# Patient Record
Sex: MALE | Race: AMERICAN INDIAN OR ALASKA NATIVE
[De-identification: names, ages, dates, MRNs, and addresses within clinical notes are randomized per-mention and may not be internally consistent; named-entity substitution may affect disease eponyms.]

---

## 2020-05-06 ENCOUNTER — HOSPITAL ENCOUNTER (INPATIENT)
Dept: HOSPITAL 5 - ED | Age: 35
LOS: 1 days | Discharge: HOME | DRG: 442 | End: 2020-05-07
Attending: INTERNAL MEDICINE | Admitting: INTERNAL MEDICINE
Payer: SELF-PAY

## 2020-05-06 DIAGNOSIS — K72.90: Primary | ICD-10-CM

## 2020-05-06 DIAGNOSIS — E72.20: ICD-10-CM

## 2020-05-06 LAB
ALBUMIN SERPL-MCNC: 3.7 G/DL (ref 3.9–5)
ALT SERPL-CCNC: 17 UNITS/L (ref 7–56)
BASOPHILS # (AUTO): 0 K/MM3 (ref 0–0.1)
BASOPHILS NFR BLD AUTO: 0.4 % (ref 0–1.8)
BENZODIAZEPINES SCREEN,URINE: (no result)
BILIRUB UR QL STRIP: (no result)
BLOOD UR QL VISUAL: (no result)
BUN SERPL-MCNC: 9 MG/DL (ref 9–20)
BUN/CREAT SERPL: 13 %
CALCIUM SERPL-MCNC: 9.3 MG/DL (ref 8.4–10.2)
EOSINOPHIL # BLD AUTO: 0.3 K/MM3 (ref 0–0.4)
EOSINOPHIL NFR BLD AUTO: 5.1 % (ref 0–4.3)
HCT VFR BLD CALC: 40.6 % (ref 35.5–45.6)
HEMOLYSIS INDEX: 5
HGB BLD-MCNC: 13.7 GM/DL (ref 11.8–15.2)
INR PPP: 1.01 (ref 0.87–1.13)
IRON SERPL-MCNC: 55 UG/DL (ref 49–181)
LYMPHOCYTES # BLD AUTO: 2.4 K/MM3 (ref 1.2–5.4)
LYMPHOCYTES NFR BLD AUTO: 37.2 % (ref 13.4–35)
MCHC RBC AUTO-ENTMCNC: 34 % (ref 32–34)
MCV RBC AUTO: 90 FL (ref 84–94)
METHADONE SCREEN,URINE: (no result)
MONOCYTES # (AUTO): 0.4 K/MM3 (ref 0–0.8)
MONOCYTES % (AUTO): 6.5 % (ref 0–7.3)
MUCOUS THREADS #/AREA URNS HPF: (no result) /HPF
OPIATE SCREEN,URINE: (no result)
PH UR STRIP: 5 [PH] (ref 5–7)
PLATELET # BLD: 209 K/MM3 (ref 140–440)
RBC # BLD AUTO: 4.54 M/MM3 (ref 3.65–5.03)
RBC #/AREA URNS HPF: 5 /HPF (ref 0–6)
TIBC SERPL-MCNC: 309 MCG/DL (ref 250–450)
UROBILINOGEN UR-MCNC: 4 MG/DL (ref ?–2)
WBC #/AREA URNS HPF: 2 /HPF (ref 0–6)

## 2020-05-06 PROCEDURE — 74177 CT ABD & PELVIS W/CONTRAST: CPT

## 2020-05-06 PROCEDURE — 80307 DRUG TEST PRSMV CHEM ANLYZR: CPT

## 2020-05-06 PROCEDURE — 36415 COLL VENOUS BLD VENIPUNCTURE: CPT

## 2020-05-06 PROCEDURE — 85025 COMPLETE CBC W/AUTO DIFF WBC: CPT

## 2020-05-06 PROCEDURE — 86706 HEP B SURFACE ANTIBODY: CPT

## 2020-05-06 PROCEDURE — G0480 DRUG TEST DEF 1-7 CLASSES: HCPCS

## 2020-05-06 PROCEDURE — 84484 ASSAY OF TROPONIN QUANT: CPT

## 2020-05-06 PROCEDURE — 82390 ASSAY OF CERULOPLASMIN: CPT

## 2020-05-06 PROCEDURE — 76705 ECHO EXAM OF ABDOMEN: CPT

## 2020-05-06 PROCEDURE — 80053 COMPREHEN METABOLIC PANEL: CPT

## 2020-05-06 PROCEDURE — 93005 ELECTROCARDIOGRAM TRACING: CPT

## 2020-05-06 PROCEDURE — 86803 HEPATITIS C AB TEST: CPT

## 2020-05-06 PROCEDURE — 83550 IRON BINDING TEST: CPT

## 2020-05-06 PROCEDURE — 82140 ASSAY OF AMMONIA: CPT

## 2020-05-06 PROCEDURE — 81001 URINALYSIS AUTO W/SCOPE: CPT

## 2020-05-06 PROCEDURE — 85610 PROTHROMBIN TIME: CPT

## 2020-05-06 PROCEDURE — 80048 BASIC METABOLIC PNL TOTAL CA: CPT

## 2020-05-06 PROCEDURE — 80074 ACUTE HEPATITIS PANEL: CPT

## 2020-05-06 PROCEDURE — 80320 DRUG SCREEN QUANTALCOHOLS: CPT

## 2020-05-06 PROCEDURE — 84443 ASSAY THYROID STIM HORMONE: CPT

## 2020-05-06 PROCEDURE — 70450 CT HEAD/BRAIN W/O DYE: CPT

## 2020-05-06 RX ADMIN — Medication SCH ML: at 13:21

## 2020-05-06 RX ADMIN — LACTULOSE SCH: 20 SOLUTION ORAL at 07:30

## 2020-05-06 RX ADMIN — LACTULOSE SCH GM: 20 SOLUTION ORAL at 23:59

## 2020-05-06 RX ADMIN — LACTULOSE SCH GM: 20 SOLUTION ORAL at 13:20

## 2020-05-06 RX ADMIN — LACTULOSE SCH GM: 20 SOLUTION ORAL at 17:05

## 2020-05-06 RX ADMIN — Medication SCH ML: at 22:16

## 2020-05-06 NOTE — ULTRASOUND REPORT
ABDOMINAL ULTRASOUND LIMITED (RIGHT UPPER QUADRANT)



HISTORY: Altered mental status and elevated ammonia levels.



COMPARISON: None.



TECHNIQUE: Multiple real-time ultrasonographic grayscale images were obtained of the right upper abdo
men.



FINDINGS:

Pancreas: Obscured by poor acoustic windows.

Liver: Normal size with the right lobe measuring 15 cm in length. However, mild diffuse increased ech
ogenicity with a heterogeneous nodular echo pattern.

Gallbladder: No stones, wall thickening or pericholecystic fluid.

Common bile duct: 3.0 mm.

Right kidney: No significant abnormality.  No hydronephrosis.  Kidney measures 9.8 cm.



Additional findings: None.



IMPRESSION:

1. Changes in the liver suggest possible hepatic cirrhosis.



2. No cholelithiasis and no biliary obstruction.



Signer Name: Chepe Cantu MD 

Signed: 5/6/2020 11:52 AM

Workstation Name: [id number]

## 2020-05-06 NOTE — EVENT NOTE
Date: 05/06/20


Patient is 34 yo with altered mental status, elevated Ammonia. I have seen and 

examined him.

## 2020-05-06 NOTE — CAT SCAN REPORT
CT ABDOMEN AND PELVIS WITH IV CONTRAST



INDICATION:

Liver disease with cirrhosis.



COMPARISON:

None available.



TECHNIQUE:

Axial CT images were obtained through the abdomen and pelvis after 100 mL IV contrast. All CT scans a
t this location are performed using CT dose reduction for ALARA by means of automated exposure contro
l. 



FINDINGS -- ABDOMEN:

Lung Bases: No acute abnormality.

Liver: The liver is grossly normal in size. The caudate lobe is slightly prominent..

Gallbladder: Normal.  Bile Ducts: Normal.

Pancreas: Normal.

Spleen: Normal.

Adrenals: Normal.

Right Kidney and Proximal Ureter: Normal.

Left Kidney and Proximal Ureter: Normal.

Stomach and Bowel: Normal.

Lymph Nodes: No significant adenopathy.

Aorta: No significant abnormality.  IVC: Normal.

Additional Findings: None.



FINDINGS -- PELVIS:

Urinary Bladder and Distal Ureters: Normal.

Reproductive Organs: No acute abnormality.

Appendix: Normal.  Bowel: No acute abnormality.

Free Fluid: None.

Lymph Nodes: No significant adenopathy.

Additional Findings: None.



Skeletal System: No acute abnormality.



IMPRESSION:

Slightly prominent caudate lobe which could be seen with early cirrhosis. No evidence of splenomegaly
 or ascites to definitely suggest portal venous hypertension at this time. The main portal vein measu
res approximately 11 mm in greatest diameter. No focal hepatic lesion is identified.



Signer Name: Jozef Chirinos MD 

Signed: 5/6/2020 9:16 PM

Workstation Name: [id number]

## 2020-05-06 NOTE — GASTROENTEROLOGY CONSULTATION
History of Present Illness





- Reason for Consult


Consult date: 05/06/20


cirrhosis/ams


Requesting physician: KERVIN EDMONDSON





- History of Present Illness





The patient is a 34 yo male who presents with ams.  pt able to provide some 

history on exam but easily distracted and difficulty gathering thoughts.  

reportedly presented with confusion/ams.  he is oriented x 3 on exam, reports 

occasionally drinking bottle of wine but does not elaborate further.  states he 

has not been feeling well since trying cocaine 10 years ago.  he is not aware of

h/o liver disease.  US showed possible signs of cirrhosis, and ammonia is 

elevated for which gi has been consulted





Past History


Past Medical History: No medical history


Past Surgical History: No surgical history


Social history: no significant social history





Medications and Allergies


                                    Allergies











Allergy/AdvReac Type Severity Reaction Status Date / Time


 


No Known Allergies Allergy   Verified 05/06/20 01:51











                                Home Medications











 Medication  Instructions  Recorded  Confirmed  Last Taken  Type


 


No Known Home Medications [No  05/06/20 05/06/20 Unknown History





Reported Home Medications]     











Active Meds: 


Active Medications





Acetaminophen (Tylenol)  650 mg PO Q4H PRN


   PRN Reason: Pain MILD(1-3)/Fever >100.5/HA


Sodium Chloride (Nacl 0.9% 1000 Ml)  1,000 mls @ 125 mls/hr IV AS DIRECT Former[facility]


   Last Admin: 05/06/20 07:47 Dose:  125 mls/hr


   Documented by: 


Lactulose (Cephulac)  20 gm PO Q6HR [facility]


   Last Admin: 05/06/20 13:20 Dose:  20 gm


   Documented by: 


Ondansetron HCl (Zofran)  4 mg IV Q8H PRN


   PRN Reason: Nausea And Vomiting


Sodium Chloride (Sodium Chloride Flush Syringe 10 Ml)  10 ml IV BID [facility]


   Last Admin: 05/06/20 13:21 Dose:  10 ml


   Documented by: 


Sodium Chloride (Sodium Chloride Flush Syringe 10 Ml)  10 ml IV PRN PRN


   PRN Reason: LINE FLUSH





reviewed/updated patient's home and current medications





Review of Systems





- Review of Systems


ROS unobtainable: due to mental status





Exam





- Constitutional


Vital Signs: 


                                        











Temp Pulse Resp BP Pulse Ox


 


 98.6 F   74   18   129/67   97 


 


 05/06/20 12:31  05/06/20 16:22  05/06/20 12:31  05/06/20 12:31  05/06/20 12:31











General appearance: no acute distress





- EENT


Eyes: PERRL, EOM intact





- Neck


Neck: supple





- Respiratory


Respiratory effort: normal


Respiratory: bilateral: CTA





- Cardiovascular


Rhythm: regular


Heart Sounds: Present: S1 & S2


Extremities: No edema





- Gastrointestinal


General gastrointestinal: Present: soft, non-tender, non-distended





- Integumentary


Integumentary: Present: clear, warm





- Neurologic


Neurological: alert and oriented x3, other (neg asterixis)





- Labs


CBC & Chem 7: 


                                 05/06/20 02:37





                                 05/06/20 02:37


Lab Results: 


                         Laboratory Results - last 24 hr











  05/06/20 05/06/20 05/06/20





  02:37 02:37 02:37


 


WBC  6.6  


 


RBC  4.54  


 


Hgb  13.7  


 


Hct  40.6  


 


MCV  90  


 


MCH  30  


 


MCHC  34  


 


RDW  14.9  


 


Plt Count  209  


 


Lymph % (Auto)  37.2 H  


 


Mono % (Auto)  6.5  


 


Eos % (Auto)  5.1 H  


 


Baso % (Auto)  0.4  


 


Lymph #  2.4  


 


Mono #  0.4  


 


Eos #  0.3  


 


Baso #  0.0  


 


Seg Neutrophils %  50.8  


 


Seg Neutrophils #  3.3  


 


PT   


 


INR   


 


Sodium   143 


 


Potassium   3.9 


 


Chloride   105.5 


 


Carbon Dioxide   27 


 


Anion Gap   14 


 


BUN   9 


 


Creatinine   0.7 L 


 


Estimated GFR   > 60 


 


BUN/Creatinine Ratio   13 


 


Glucose   121 H 


 


Calcium   9.3 


 


Total Bilirubin   0.20 


 


AST   14 


 


ALT   17 


 


Alkaline Phosphatase   94 


 


Ammonia    82.0 H


 


Troponin T   < 0.010 


 


Total Protein   6.1 L 


 


Albumin   3.7 L 


 


Albumin/Globulin Ratio   1.5 


 


TSH   


 


Urine Color   


 


Urine Turbidity   


 


Urine pH   


 


Ur Specific Gravity   


 


Urine Protein   


 


Urine Glucose (UA)   


 


Urine Ketones   


 


Urine Blood   


 


Urine Nitrite   


 


Urine Bilirubin   


 


Urine Ictotest   


 


Urine Urobilinogen   


 


Ur Leukocyte Esterase   


 


Urine WBC (Auto)   


 


Urine RBC (Auto)   


 


Urine Mucus   


 


Urine Opiates Screen   


 


Urine Methadone Screen   


 


Ur Barbiturates Screen   


 


Ur Phencyclidine Scrn   


 


Ur Amphetamines Screen   


 


U Benzodiazepines Scrn   


 


Urine Cocaine Screen   


 


U Marijuana (THC) Screen   


 


Drugs of Abuse Note   


 


Plasma/Serum Alcohol   


 


Hepatitis A IgM Ab   


 


Hep Bs Antigen   


 


Hep B Core IgM Ab   


 


Hepatitis C Antibody   














  05/06/20 05/06/20 05/06/20





  02:37 02:37 02:39


 


WBC   


 


RBC   


 


Hgb   


 


Hct   


 


MCV   


 


MCH   


 


MCHC   


 


RDW   


 


Plt Count   


 


Lymph % (Auto)   


 


Mono % (Auto)   


 


Eos % (Auto)   


 


Baso % (Auto)   


 


Lymph #   


 


Mono #   


 


Eos #   


 


Baso #   


 


Seg Neutrophils %   


 


Seg Neutrophils #   


 


PT   


 


INR   


 


Sodium   


 


Potassium   


 


Chloride   


 


Carbon Dioxide   


 


Anion Gap   


 


BUN   


 


Creatinine   


 


Estimated GFR   


 


BUN/Creatinine Ratio   


 


Glucose   


 


Calcium   


 


Total Bilirubin   


 


AST   


 


ALT   


 


Alkaline Phosphatase   


 


Ammonia   


 


Troponin T   


 


Total Protein   


 


Albumin   


 


Albumin/Globulin Ratio   


 


TSH  1.590  


 


Urine Color   


 


Urine Turbidity   


 


Urine pH   


 


Ur Specific Gravity   


 


Urine Protein   


 


Urine Glucose (UA)   


 


Urine Ketones   


 


Urine Blood   


 


Urine Nitrite   


 


Urine Bilirubin   


 


Urine Ictotest   


 


Urine Urobilinogen   


 


Ur Leukocyte Esterase   


 


Urine WBC (Auto)   


 


Urine RBC (Auto)   


 


Urine Mucus   


 


Urine Opiates Screen    Presumptive negative


 


Urine Methadone Screen    Presumptive negative


 


Ur Barbiturates Screen    Presumptive negative


 


Ur Phencyclidine Scrn    Presumptive negative


 


Ur Amphetamines Screen    Presumptive negative


 


U Benzodiazepines Scrn    Presumptive negative


 


Urine Cocaine Screen    Presumptive negative


 


U Marijuana (THC) Screen    Presumptive negative


 


Drugs of Abuse Note    Disclamer


 


Plasma/Serum Alcohol   < 0.01 


 


Hepatitis A IgM Ab   


 


Hep Bs Antigen   


 


Hep B Core IgM Ab   


 


Hepatitis C Antibody   














  05/06/20 05/06/20 05/06/20





  02:40 13:48 13:48


 


WBC   


 


RBC   


 


Hgb   


 


Hct   


 


MCV   


 


MCH   


 


MCHC   


 


RDW   


 


Plt Count   


 


Lymph % (Auto)   


 


Mono % (Auto)   


 


Eos % (Auto)   


 


Baso % (Auto)   


 


Lymph #   


 


Mono #   


 


Eos #   


 


Baso #   


 


Seg Neutrophils %   


 


Seg Neutrophils #   


 


PT    13.4


 


INR    1.01


 


Sodium   


 


Potassium   


 


Chloride   


 


Carbon Dioxide   


 


Anion Gap   


 


BUN   


 


Creatinine   


 


Estimated GFR   


 


BUN/Creatinine Ratio   


 


Glucose   


 


Calcium   


 


Total Bilirubin   


 


AST   


 


ALT   


 


Alkaline Phosphatase   


 


Ammonia   


 


Troponin T   


 


Total Protein   


 


Albumin   


 


Albumin/Globulin Ratio   


 


TSH   


 


Urine Color  Miriam  


 


Urine Turbidity  Clear  


 


Urine pH  5.0  


 


Ur Specific Gravity  1.036 H  


 


Urine Protein  30 mg/dl  


 


Urine Glucose (UA)  Neg  


 


Urine Ketones  Tr  


 


Urine Blood  Neg  


 


Urine Nitrite  Neg  


 


Urine Bilirubin  Sm  


 


Urine Ictotest  Negative  


 


Urine Urobilinogen  4.0  


 


Ur Leukocyte Esterase  Neg  


 


Urine WBC (Auto)  2.0  


 


Urine RBC (Auto)  5.0  


 


Urine Mucus  3+  


 


Urine Opiates Screen   


 


Urine Methadone Screen   


 


Ur Barbiturates Screen   


 


Ur Phencyclidine Scrn   


 


Ur Amphetamines Screen   


 


U Benzodiazepines Scrn   


 


Urine Cocaine Screen   


 


U Marijuana (THC) Screen   


 


Drugs of Abuse Note   


 


Plasma/Serum Alcohol   


 


Hepatitis A IgM Ab   Non-reactive 


 


Hep Bs Antigen   Non-reactive 


 


Hep B Core IgM Ab   Non-reactive 


 


Hepatitis C Antibody   Non-reactive 














Assessment and Plan





1. Altered mental status


2. ?cirrhosis - per US read however labs do not suggest this.  ct scan for 

better characterization of liver.  will check viral serologies, ceruloplasmin 

(given age, ams), iron studies to start work-up.  cont lactulose for now

## 2020-05-06 NOTE — CAT SCAN REPORT
CT head/brain wo con



INDICATION:  Altered Mental Status Unknown if patient is E.T.O.H..



TECHNIQUE:

All CT scans at this location are performed using the following dose modulation technique: Automated 
exposure control.



CONTRAST:  None.



COMPARISON: None available.



FINDINGS: The ventricular system is appropriate in size and configuration without midline shift. Nega
tive for mass, stroke or hemorrhage.



Evaluation sinuses demonstrate mild thickening greatest at the ethmoid air cells.



IMPRESSION: Mild thickening greatest at the ethmoid air cells.



Signer Name: Chase Banuelos MD 

Signed: 5/6/2020 4:23 AM

Workstation Name: [id number]

## 2020-05-06 NOTE — HISTORY AND PHYSICAL REPORT
History of Present Illness


History of present illness: 


35-year-old male was brought to the emergency room for confusion.  Very 

difficult to obtain a history from the patient, he only mumbles his name over 

and over.  Labs show elevated ammonia level, patient will be admitted for 

hepatic encephalopathy








PAST MEDICAL HISTORY: Unknown





PAST SURGICAL HISTORY: Unknown





SOCIAL HISTORY: Unknown





FAMILY HISTORY: Unknown











Medications and Allergies


                                    Allergies











Allergy/AdvReac Type Severity Reaction Status Date / Time


 


No Known Allergies Allergy   Verified 05/06/20 01:51











                                Home Medications











 Medication  Instructions  Recorded  Confirmed  Last Taken  Type


 


Lactulose [Cephulac] 20 gm PO BID #1 bottle 05/07/20  Unknown Rx











Active Meds: 


Active Medications





Acetaminophen (Tylenol)  650 mg PO Q4H PRN


   PRN Reason: Pain MILD(1-3)/Fever >100.5/HA


Ondansetron HCl (Zofran)  4 mg IV Q8H PRN


   PRN Reason: Nausea And Vomiting


Sodium Chloride (Sodium Chloride Flush Syringe 10 Ml)  10 ml IV BID SANDRA


Sodium Chloride (Sodium Chloride Flush Syringe 10 Ml)  10 ml IV PRN PRN


   PRN Reason: LINE FLUSH











Exam





- Physical Exam


Narrative exam: 


Gen. appearance: Patient lying in bed, no apparent distress


HEENT: Normocephalic, atraumatic, pupils equally round and reactive to light, 

extraocular movement intact, and no sclericterus,. No JVD or thyromegaly or 

nodule,neck supple, no carotid bruit ,mucous membranes moist, no exudate or 

erythema


Heart: S1, S2, regular rate and rhythm


Lungs: Crackles bilaterally, breathing comfortable


Abdomen: Positive bowel sounds, nontender, nondistended, no organomegaly


Extremity: no edema, cyanosis, clubbing


Skin: No rash, nodules, warm, dry


Neuro: speech is fluent, moves extremities, sensory intact











- Constitutional


Vitals: 


                                        











Temp Pulse Resp BP Pulse Ox


 


 98.0 F   53 L  13   104/54   96 


 


 05/06/20 02:10  05/06/20 05:30  05/06/20 05:30  05/06/20 05:30  05/06/20 05:30














Results





- Labs


CBC & Chem 7: 


                                 05/07/20 04:26





                                 05/07/20 04:26


Labs: 


                              Abnormal lab results











  05/06/20 05/06/20 05/06/20 Range/Units





  02:37 02:37 02:37 


 


Lymph % (Auto)  37.2 H    (13.4-35.0)  %


 


Eos % (Auto)  5.1 H    (0.0-4.3)  %


 


Creatinine   0.7 L   (0.8-1.5)  mg/dL


 


Glucose   121 H   ()  mg/dL


 


Ammonia    82.0 H  (25-60)  umol/L


 


Total Protein   6.1 L   (6.3-8.2)  g/dL


 


Albumin   3.7 L   (3.9-5)  g/dL


 


Ur Specific Gravity     (1.003-1.030)  














  05/06/20 Range/Units





  02:40 


 


Lymph % (Auto)   (13.4-35.0)  %


 


Eos % (Auto)   (0.0-4.3)  %


 


Creatinine   (0.8-1.5)  mg/dL


 


Glucose   ()  mg/dL


 


Ammonia   (25-60)  umol/L


 


Total Protein   (6.3-8.2)  g/dL


 


Albumin   (3.9-5)  g/dL


 


Ur Specific Gravity  1.036 H  (1.003-1.030)  














- Imaging and Cardiology


CT Scan - head: report reviewed





Assessment and Plan


Assessment


Hepatic encephalopathy


Start lactulose, IV fluids


Monitor ammonia level


DVT prophylax

## 2020-05-06 NOTE — EMERGENCY DEPARTMENT REPORT
HPI





- General


Chief Complaint: Dizziness


Time Seen by Provider: 05/06/20 02:06





- HPI


HPI: 





35-year-old male presents to the emergency department with a complaint of some 

lightheadedness/dizziness and saying that he just "does not feel right."  

However, the patient says that this has been going on over the past 5 years.  

When asked what brought him in this evening the patient just repeats that he 

"just does not feel right."  He denies any headache, vision change, slurred 

speech, fever, chest pain, shortness of breath, numbness or paresthesias.  The 

patient says that he feels confused and that he is forgetting things.  He denies

any past medical history.  He is a tobacco smoker but denies any recent illicit 

drug use.  He does not have a primary care physician.  No recent travel or sick 

contacts at home.





ED Past Medical Hx





- Past Medical History


Previous Medical History?: No





- Surgical History


Past Surgical History?: No





- Social History


Smoking Status: Never Smoker


Substance Use Type: None





ED Review of Systems


ROS: 


Stated complaint: DOESNT FEEL RIGHT


Other details as noted in HPI





Comment: All other systems reviewed and negative


Constitutional: denies: chills, fever


Eyes: denies: eye pain, vision change


ENT: denies: ear pain, throat pain


Respiratory: denies: cough, shortness of breath


Cardiovascular: denies: chest pain, palpitations


Gastrointestinal: denies: abdominal pain, vomiting


Genitourinary: denies: dysuria, discharge


Musculoskeletal: denies: back pain, arthralgia


Skin: denies: rash, lesions


Neurological: confusion.  denies: headache





Physical Exam





- Physical Exam


Vital Signs: 


                                   Vital Signs











  05/06/20 05/06/20





  01:53 02:10


 


Temperature 97.5 F L 98.0 F


 


Pulse Rate 76 76


 


Respiratory 16 19





Rate  


 


Blood Pressure 115/78 


 


Blood Pressure  108/57





[Left]  


 


O2 Sat by Pulse 98 95





Oximetry  











Physical Exam: 





GENERAL: The patient is well-developed well-nourished.


HENT: Normocephalic.  Atraumatic.    Patient has moist mucous membranes.


EYES: Extraocular motions are intact.  Pupils equal reactive to light 

bilaterally.


NECK: Supple. Trachea is midline.


CHEST/LUNGS: Clear to auscultation.  There is no respiratory distress noted.


HEART/CARDIOVASCULAR: Regular.  There is no tachycardia.  There is no murmur.


ABDOMEN: Abdomen is soft, nontender.  Patient has normal bowel sounds.  There is

 no abdominal distention.


SKIN: Skin is warm and dry. 


NEURO: Patient is sleepy but is easily arousable.  Once awake he is oriented, 

but is slow to respond.  Occasionally he does appear slightly confused or has to

 have things repeated to him multiple times.  The patient is cooperative.  No 

facial asymmetry.  No pronator drift or dysmetria.


MUSCULOSKELETAL: There is no tenderness or deformity.  There is no limitation 

range of motion.  





ED Course


                                   Vital Signs











  05/06/20 05/06/20





  01:53 02:10


 


Temperature 97.5 F L 98.0 F


 


Pulse Rate 76 76


 


Respiratory 16 19





Rate  


 


Blood Pressure 115/78 


 


Blood Pressure  108/57





[Left]  


 


O2 Sat by Pulse 98 95





Oximetry  














ED Medical Decision Making





- Lab Data


Result diagrams: 


                                 05/06/20 02:37





                                 05/06/20 02:37





- EKG Data


-: EKG Interpreted by Me


EKG shows normal: sinus rhythm, axis, intervals, QRS complexes, ST-T waves (vanessa

y repolarization)


Rate: normal





- EKG Data


When compared to previous EKG there are: previous EKG unavailable


Interpretation: normal EKG





- Radiology Data


Radiology results: report reviewed





CT head/brain wo con INDICATION: Altered Mental Status Unknown if patient is 

E.T.O.H.. TECHNIQUE: All CT scans at this location are performed using the 

following dose modulation technique: Automated exposure control. CONTRAST: None.

 COMPARISON: None available. FINDINGS: The ventricular system is appropriate in 

size and configuration without midline shift. Negative for mass, stroke or 

hemorrhage. Evaluation sinuses demonstrate mild thickening greatest at the et

hmoid air cells. IMPRESSION: Mild thickening greatest at the ethmoid air cells. 





- Medical Decision Making





This patient presents to the emergency department with the generic complaint of 

"I do not feel right."  The patient is rather sleepy but is easily arousable.  

Once awake he is oriented to person, place and time.  However he is slow to 

respond, will go right back to sleep if not stimulated, and sometimes needs the 

question or request to be repeated multiple times.  I do not see any motor, 

sensory or lateralizing deficits.  Cranial nerves II through XII grossly intact.

  CT scan of the head without contrast did not show any bleed, shift, mass, 

ischemia, or any other acute process.  Patient's labs are mostly unremarkable 

except for hyperammonemia with a level of 82.  No elevated LFTs or bilirubin.  

Negative UDS and blood alcohol level.  EKG does not show any signs of ST 

elevation MI or dysrhythmia.  The patient was given a dose of lactulose for this

 hepatic encephalopathy and will be admitted to the hospital for further 

evaluation and treatment.  He has been accepted for admission by the 

hospitalist, Dr. Gaytan.


Critical Care Time: No


Critical care attestation.: 


If time is entered above; I have spent that time in minutes in the direct care 

of this critically ill patient, excluding procedure time.








ED Disposition


Clinical Impression: 


 Hepatic encephalopathy, Hyperammonemia





Disposition: DC-09 OP ADMIT IP TO THIS HOSP


Is pt being admited?: Yes


Condition: Fair


Time of Disposition: 04:33

## 2020-05-07 VITALS — SYSTOLIC BLOOD PRESSURE: 124 MMHG | DIASTOLIC BLOOD PRESSURE: 72 MMHG

## 2020-05-07 LAB
BASOPHILS # (AUTO): 0 K/MM3 (ref 0–0.1)
BASOPHILS NFR BLD AUTO: 0.3 % (ref 0–1.8)
BUN SERPL-MCNC: 7 MG/DL (ref 9–20)
BUN/CREAT SERPL: 10 %
CALCIUM SERPL-MCNC: 8.8 MG/DL (ref 8.4–10.2)
EOSINOPHIL # BLD AUTO: 0.2 K/MM3 (ref 0–0.4)
EOSINOPHIL NFR BLD AUTO: 2.7 % (ref 0–4.3)
HCT VFR BLD CALC: 40.6 % (ref 35.5–45.6)
HEMOLYSIS INDEX: 36
HGB BLD-MCNC: 13.5 GM/DL (ref 11.8–15.2)
LYMPHOCYTES # BLD AUTO: 1.9 K/MM3 (ref 1.2–5.4)
LYMPHOCYTES NFR BLD AUTO: 23.3 % (ref 13.4–35)
MCHC RBC AUTO-ENTMCNC: 33 % (ref 32–34)
MCV RBC AUTO: 89 FL (ref 84–94)
MONOCYTES # (AUTO): 0.4 K/MM3 (ref 0–0.8)
MONOCYTES % (AUTO): 5 % (ref 0–7.3)
PLATELET # BLD: 219 K/MM3 (ref 140–440)
RBC # BLD AUTO: 4.56 M/MM3 (ref 3.65–5.03)

## 2020-05-07 RX ADMIN — LACTULOSE SCH GM: 20 SOLUTION ORAL at 13:15

## 2020-05-07 RX ADMIN — Medication SCH ML: at 09:28

## 2020-05-07 RX ADMIN — LACTULOSE SCH GM: 20 SOLUTION ORAL at 05:03

## 2020-05-07 NOTE — DISCHARGE SUMMARY
Providers





- Providers


Date of Admission: 


05/06/20 05:43





Date of discharge: 05/07/20


Attending physician: 


KERVIN EDMONDSON





                                        





05/06/20 12:09


Consult to Physician [CONS] Routine 


   Comment: 


   Consulting Provider: MILY MONTESINOS


   Physician Instructions: 


   Reason For Exam: Liver cirrhosis, elevated ammonia, new











Primary care physician: 


PRIMARY CARE MD








Hospitalization


Condition: Fair


Disposition: DC-01 TO HOME OR SELFCARE





Core Measure Documentation





- Palliative Care


Palliative Care/ Comfort Measures: Not Applicable





- Core Measures


Any of the following diagnoses?: none





Exam





- Constitutional


Vitals: 


                                        











Temp Pulse Resp BP Pulse Ox


 


 98.1 F   46 L  20   119/59   96 


 


 05/07/20 08:13  05/07/20 08:13  05/07/20 08:13  05/07/20 08:13  05/07/20 08:13














Plan


Activity: no restrictions


Diet: regular


Additional Instructions: 1.Follow up with PCP in 1 week.  2.Follow up with Dr. Sharif Teixeira, GI in 2 weeks


Follow up with: 


PRIMARY CARE,MD [Primary Care Provider] - 7 Days


Prescriptions: 


Lactulose [Cephulac] 20 gm PO BID #1 bottle